# Patient Record
Sex: FEMALE | Race: WHITE | NOT HISPANIC OR LATINO | Employment: OTHER | URBAN - METROPOLITAN AREA
[De-identification: names, ages, dates, MRNs, and addresses within clinical notes are randomized per-mention and may not be internally consistent; named-entity substitution may affect disease eponyms.]

---

## 2023-06-09 LAB
ALANINE AMINOTRANSFERASE (SGPT) (U/L) IN SER/PLAS: 10 U/L (ref 7–45)
ALBUMIN (G/DL) IN SER/PLAS: 4 G/DL (ref 3.4–5)
ALKALINE PHOSPHATASE (U/L) IN SER/PLAS: 66 U/L (ref 33–136)
ANION GAP IN SER/PLAS: 14 MMOL/L (ref 10–20)
ASPARTATE AMINOTRANSFERASE (SGOT) (U/L) IN SER/PLAS: 13 U/L (ref 9–39)
BASOPHILS (10*3/UL) IN BLOOD BY AUTOMATED COUNT: 0.08 X10E9/L (ref 0–0.1)
BASOPHILS/100 LEUKOCYTES IN BLOOD BY AUTOMATED COUNT: 1.2 % (ref 0–2)
BILIRUBIN TOTAL (MG/DL) IN SER/PLAS: 0.5 MG/DL (ref 0–1.2)
CALCIDIOL (25 OH VITAMIN D3) (NG/ML) IN SER/PLAS: 71 NG/ML
CALCIUM (MG/DL) IN SER/PLAS: 9.5 MG/DL (ref 8.6–10.3)
CARBON DIOXIDE, TOTAL (MMOL/L) IN SER/PLAS: 28 MMOL/L (ref 21–32)
CHLORIDE (MMOL/L) IN SER/PLAS: 103 MMOL/L (ref 98–107)
CHOLESTEROL (MG/DL) IN SER/PLAS: 211 MG/DL (ref 0–199)
CHOLESTEROL IN HDL (MG/DL) IN SER/PLAS: 42.7 MG/DL
CHOLESTEROL/HDL RATIO: 4.9
CREATININE (MG/DL) IN SER/PLAS: 0.8 MG/DL (ref 0.5–1.05)
EOSINOPHILS (10*3/UL) IN BLOOD BY AUTOMATED COUNT: 0.1 X10E9/L (ref 0–0.4)
EOSINOPHILS/100 LEUKOCYTES IN BLOOD BY AUTOMATED COUNT: 1.5 % (ref 0–6)
ERYTHROCYTE DISTRIBUTION WIDTH (RATIO) BY AUTOMATED COUNT: 12.4 % (ref 11.5–14.5)
ERYTHROCYTE MEAN CORPUSCULAR HEMOGLOBIN CONCENTRATION (G/DL) BY AUTOMATED: 33.8 G/DL (ref 32–36)
ERYTHROCYTE MEAN CORPUSCULAR VOLUME (FL) BY AUTOMATED COUNT: 89 FL (ref 80–100)
ERYTHROCYTES (10*6/UL) IN BLOOD BY AUTOMATED COUNT: 4.5 X10E12/L (ref 4–5.2)
GFR FEMALE: 76 ML/MIN/1.73M2
GLUCOSE (MG/DL) IN SER/PLAS: 91 MG/DL (ref 74–99)
HEMATOCRIT (%) IN BLOOD BY AUTOMATED COUNT: 39.9 % (ref 36–46)
HEMOGLOBIN (G/DL) IN BLOOD: 13.5 G/DL (ref 12–16)
IMMATURE GRANULOCYTES/100 LEUKOCYTES IN BLOOD BY AUTOMATED COUNT: 0.1 % (ref 0–0.9)
LDL: 130 MG/DL (ref 0–99)
LEUKOCYTES (10*3/UL) IN BLOOD BY AUTOMATED COUNT: 6.7 X10E9/L (ref 4.4–11.3)
LYMPHOCYTES (10*3/UL) IN BLOOD BY AUTOMATED COUNT: 2.2 X10E9/L (ref 0.8–3)
LYMPHOCYTES/100 LEUKOCYTES IN BLOOD BY AUTOMATED COUNT: 33 % (ref 13–44)
MONOCYTES (10*3/UL) IN BLOOD BY AUTOMATED COUNT: 0.7 X10E9/L (ref 0.05–0.8)
MONOCYTES/100 LEUKOCYTES IN BLOOD BY AUTOMATED COUNT: 10.5 % (ref 2–10)
NEUTROPHILS (10*3/UL) IN BLOOD BY AUTOMATED COUNT: 3.58 X10E9/L (ref 1.6–5.5)
NEUTROPHILS/100 LEUKOCYTES IN BLOOD BY AUTOMATED COUNT: 53.7 % (ref 40–80)
PLATELETS (10*3/UL) IN BLOOD AUTOMATED COUNT: 306 X10E9/L (ref 150–450)
POTASSIUM (MMOL/L) IN SER/PLAS: 4.6 MMOL/L (ref 3.5–5.3)
PROTEIN TOTAL: 7.1 G/DL (ref 6.4–8.2)
SODIUM (MMOL/L) IN SER/PLAS: 140 MMOL/L (ref 136–145)
THYROTROPIN (MIU/L) IN SER/PLAS BY DETECTION LIMIT <= 0.05 MIU/L: 2.69 MIU/L (ref 0.44–3.98)
TRIGLYCERIDE (MG/DL) IN SER/PLAS: 193 MG/DL (ref 0–149)
UREA NITROGEN (MG/DL) IN SER/PLAS: 15 MG/DL (ref 6–23)
VLDL: 39 MG/DL (ref 0–40)

## 2023-06-15 ENCOUNTER — OFFICE VISIT (OUTPATIENT)
Dept: PRIMARY CARE | Facility: CLINIC | Age: 78
End: 2023-06-15
Payer: MEDICARE

## 2023-06-15 VITALS
DIASTOLIC BLOOD PRESSURE: 90 MMHG | TEMPERATURE: 98.1 F | SYSTOLIC BLOOD PRESSURE: 150 MMHG | WEIGHT: 255.2 LBS | BODY MASS INDEX: 43.57 KG/M2 | HEIGHT: 64 IN

## 2023-06-15 DIAGNOSIS — E78.41 ELEVATED LIPOPROTEIN(A): ICD-10-CM

## 2023-06-15 DIAGNOSIS — R73.01 ABNORMAL FASTING GLUCOSE: ICD-10-CM

## 2023-06-15 DIAGNOSIS — G47.33 OSA (OBSTRUCTIVE SLEEP APNEA): ICD-10-CM

## 2023-06-15 DIAGNOSIS — F32.9 REACTIVE DEPRESSION: ICD-10-CM

## 2023-06-15 DIAGNOSIS — I10 BENIGN ESSENTIAL HYPERTENSION: ICD-10-CM

## 2023-06-15 DIAGNOSIS — Z00.00 MEDICARE ANNUAL WELLNESS VISIT, SUBSEQUENT: ICD-10-CM

## 2023-06-15 DIAGNOSIS — C50.919 MALIGNANT NEOPLASM OF FEMALE BREAST, UNSPECIFIED ESTROGEN RECEPTOR STATUS, UNSPECIFIED LATERALITY, UNSPECIFIED SITE OF BREAST (MULTI): ICD-10-CM

## 2023-06-15 DIAGNOSIS — F51.01 PRIMARY INSOMNIA: Primary | ICD-10-CM

## 2023-06-15 DIAGNOSIS — E83.51 HYPOCALCEMIA: ICD-10-CM

## 2023-06-15 DIAGNOSIS — I10 HTN (HYPERTENSION), BENIGN: Primary | ICD-10-CM

## 2023-06-15 DIAGNOSIS — J30.2 SEASONAL ALLERGIC RHINITIS, UNSPECIFIED TRIGGER: ICD-10-CM

## 2023-06-15 DIAGNOSIS — R92.8 ABNORMAL MAMMOGRAM: ICD-10-CM

## 2023-06-15 DIAGNOSIS — E55.9 VITAMIN D DEFICIENCY: ICD-10-CM

## 2023-06-15 DIAGNOSIS — N95.1 MENOPAUSAL STATE: ICD-10-CM

## 2023-06-15 PROBLEM — F32.A DEPRESSION: Status: ACTIVE | Noted: 2023-06-15

## 2023-06-15 PROBLEM — H02.413 MECHANICAL PTOSIS OF EYELID OF BOTH EYES: Status: ACTIVE | Noted: 2023-06-15

## 2023-06-15 PROBLEM — E78.5 HYPERLIPIDEMIA: Status: ACTIVE | Noted: 2023-06-15

## 2023-06-15 PROBLEM — J30.9 ALLERGIC RHINITIS: Status: ACTIVE | Noted: 2023-06-15

## 2023-06-15 PROBLEM — G47.00 INSOMNIA: Status: ACTIVE | Noted: 2023-06-15

## 2023-06-15 PROBLEM — H02.403 PTOSIS, BOTH EYELIDS: Status: ACTIVE | Noted: 2023-06-15

## 2023-06-15 LAB — POC HEMOGLOBIN A1C: 5.8 % (ref 4.2–6.5)

## 2023-06-15 PROCEDURE — 1170F FXNL STATUS ASSESSED: CPT | Performed by: FAMILY MEDICINE

## 2023-06-15 PROCEDURE — 99214 OFFICE O/P EST MOD 30 MIN: CPT | Performed by: FAMILY MEDICINE

## 2023-06-15 PROCEDURE — 3077F SYST BP >= 140 MM HG: CPT | Performed by: FAMILY MEDICINE

## 2023-06-15 PROCEDURE — 1159F MED LIST DOCD IN RCRD: CPT | Performed by: FAMILY MEDICINE

## 2023-06-15 PROCEDURE — 1036F TOBACCO NON-USER: CPT | Performed by: FAMILY MEDICINE

## 2023-06-15 PROCEDURE — 83036 HEMOGLOBIN GLYCOSYLATED A1C: CPT | Performed by: FAMILY MEDICINE

## 2023-06-15 PROCEDURE — G0439 PPPS, SUBSEQ VISIT: HCPCS | Performed by: FAMILY MEDICINE

## 2023-06-15 PROCEDURE — 3080F DIAST BP >= 90 MM HG: CPT | Performed by: FAMILY MEDICINE

## 2023-06-15 PROCEDURE — 1160F RVW MEDS BY RX/DR IN RCRD: CPT | Performed by: FAMILY MEDICINE

## 2023-06-15 RX ORDER — CHOLECALCIFEROL (VITAMIN D3) 125 MCG
1 CAPSULE ORAL DAILY
COMMUNITY
Start: 2019-01-22 | End: 2023-09-15 | Stop reason: WASHOUT

## 2023-06-15 RX ORDER — LOSARTAN POTASSIUM 100 MG/1
100 TABLET ORAL DAILY
COMMUNITY
Start: 2023-03-01 | End: 2023-06-19

## 2023-06-15 RX ORDER — CITALOPRAM 10 MG/1
10 TABLET ORAL DAILY
COMMUNITY
End: 2023-09-15 | Stop reason: SDUPTHER

## 2023-06-15 RX ORDER — METOPROLOL SUCCINATE 100 MG/1
100 TABLET, EXTENDED RELEASE ORAL DAILY
COMMUNITY
End: 2023-09-15 | Stop reason: SDUPTHER

## 2023-06-15 ASSESSMENT — ENCOUNTER SYMPTOMS
BRUISES/BLEEDS EASILY: 0
BLOOD IN STOOL: 0
HEADACHES: 0
FATIGUE: 0
EYE PAIN: 0
APPETITE CHANGE: 0
EYE REDNESS: 0
SHORTNESS OF BREATH: 0
SORE THROAT: 0
ARTHRALGIAS: 0
FEVER: 0
DIARRHEA: 0
VOMITING: 0
FREQUENCY: 0
WEAKNESS: 0
ABDOMINAL PAIN: 0
DIZZINESS: 0
EYE DISCHARGE: 0
NUMBNESS: 0
CHILLS: 0
EYE ITCHING: 0
CONSTIPATION: 0
AGITATION: 0
SEIZURES: 0
CONFUSION: 0
ADENOPATHY: 0
WHEEZING: 0
TREMORS: 0
NAUSEA: 0
TROUBLE SWALLOWING: 0
COUGH: 0
NERVOUS/ANXIOUS: 0
VOICE CHANGE: 0
ABDOMINAL DISTENTION: 0
HEMATURIA: 0
NECK STIFFNESS: 0
ANAL BLEEDING: 0
BACK PAIN: 0
CHEST TIGHTNESS: 0
SINUS PRESSURE: 0
PHOTOPHOBIA: 0
SLEEP DISTURBANCE: 0
NECK PAIN: 0
SPEECH DIFFICULTY: 0
UNEXPECTED WEIGHT CHANGE: 0
PALPITATIONS: 0
DECREASED CONCENTRATION: 0
DIAPHORESIS: 0

## 2023-06-15 ASSESSMENT — PATIENT HEALTH QUESTIONNAIRE - PHQ9
SUM OF ALL RESPONSES TO PHQ9 QUESTIONS 1 AND 2: 0
1. LITTLE INTEREST OR PLEASURE IN DOING THINGS: NOT AT ALL
1. LITTLE INTEREST OR PLEASURE IN DOING THINGS: NOT AT ALL
2. FEELING DOWN, DEPRESSED OR HOPELESS: NOT AT ALL
SUM OF ALL RESPONSES TO PHQ9 QUESTIONS 1 AND 2: 0
2. FEELING DOWN, DEPRESSED OR HOPELESS: NOT AT ALL

## 2023-06-15 ASSESSMENT — ACTIVITIES OF DAILY LIVING (ADL)
MANAGING_FINANCES: INDEPENDENT
TAKING_MEDICATION: INDEPENDENT
GROCERY_SHOPPING: INDEPENDENT
DOING_HOUSEWORK: INDEPENDENT
DRESSING: INDEPENDENT
BATHING: INDEPENDENT

## 2023-06-15 NOTE — PROGRESS NOTES
"Subjective   Reason for Visit: Kathleen A Olszewski is an 78 y.o. female here for a Medicare Wellness visit.               HPI  #1 Health Maintenance:  DTaP 11/11/2011  Pneumovax 9/12/2012  Prevnar 13 done 1/22/2015  Zostavax done 1/22/2019 on the list waiting  Colonoscopy 11/29/2005 Dr. Gage, 1/26/2016 Dr. Salvador to be scheduled when her daughter can take her (7/21/2016), 1/30/2017 referral made, 7/27/2017, 1/18/2018 reminded, April 13, 2018, 1/22/2019 reminded June 22, 2020 Dr. Myers colonoscopy completed good for 10 years  Pap smear  Mammogram 2/29/2016 [facility] normal, 3/8/2017 normal, recheck 3/12 2018 completed, March 19, 2019 normal June 18, 2020 mammogram completed  DEXA scan 10/27/2011 spine -0.7, hip -0.3, femoral neck -0.4 1/26/2016 pending, will do January 2017 secondary to insurance restrictions, 1/30/2017 slip written, 7/27/2017 spine -0.1 (+6%), hip -0.3 (+0.9%), femoral neck -0.2 (+1.4%), will wait till July 2020  CMP 8/28/2014, 7/28/2015, 7/21/2016 normal, 1/12/2018 (glucose 115), 1/18/2018 hemoglobin A1c 6.4%, 1/15/2019 CMP (glucose 109+ TSH 2.64+ CBC plus vitamin D 53, December 3, 2020 CMP (glucose 102) plus TSH 3.44+ vitamin D 50  Vitamin D 6/27/2014 value 32, 7/28/15 pending 42, 7/21/2016 39, 1/12/2018 value 27, 4/2/2018 value 44 junk  Will continue on 6000 international units of vitamin D3, but will stop taking \"gumies\" and will recheck in 3 months  7/19/2018 hemoglobin A1c down from 6.4% down to 6.1% (weight has come down from 270 down to 262 and is now 259 pounds), 1/22/2019 weight the same promises 10 pounds in the next 6 months, CMP January 6, 2020 (glucose 104), CBC (platelets 148,000) plus TSH 2.41+ vitamin D 71 (will go from 5000 down to 4000 international units daily)  May 25, 2021 hemoglobin A1c 5.9% plus CBC (monocytes 10%)   January 19, 2023 A1c 5.8%  1    #2 Nasal Obstruction:  The patient has been having trouble breathing through her nose and as a result has to breathe " through her mouth. She has had a nasal septal deviation and her right nostril is more obstructive than the left. I will have her see Dr. Malou Marin in consultation    #3 Hyperlipidemia:  1/18/2018 total 205 HDL 42 ratio 4.9  triglycerides 187  4/13/2018 total 218 HDL 46 ratio 4.8 LDL 14.2 triglycerides 154  7/19/2018 total 216 HDL 44 ratio 4.9  triglycerides 163  1/15/2019 total 235 HDL 48.2 ratio 4.9  triglycerides 165  July 10, 2019 total 195 HDL 44 ratio 4.5  triglycerides 153  January 10, 2020 and June 30, 2020 completed ratio 5.1  May 25, 2021 total 220 HDL 42.5 ratio 5.2  triglycerides 173  January 18, 2023 total 225 LDL 48 ratio 4.1  triglycerides 199    #4 Elevated BMI:  June 1, 2021 BMI 45.67  BMI is elevated the patient was notified as to its significance both in terms of general health status, but also specifically as it relates to other serious medical problems including hypertension, coronary artery disease, diabetes, GERD, and sleep apnea. I have recommended the patient start following a diet plan to lose weight which includes calorie restriction, portion regulation, and regular exercise.  July 28, 2021 BMI unchanged reiterated above  January 18, 2023 BMI 45    January 19, 2023  This 78-year-old is here today for normal 6-month follow-up. She has been doing well over the holiday her hemoglobin A1c did go back up to a value of 5.8%. Her lipids are stable. She will be coming in in June for her Medicare wellness exam. I am also giving her a lab slip to get her labs rechecked prior to that visit. Her blood pressure readings have been high. Her initial reading this morning was 150/90. She will continue on her metoprolol 100 mg but I have also given her a prescription for losartan 100 mg to start taking if her blood pressure at home are above 140 and or above 85.  1    June 2, 2022  This 77-year-old woman is here today for Medicare wellness exam. She is also here  "because her recent lab test showed a low level of calcium. Because she is lactose intolerant she is concerned that she may not be getting enough calcium through her diet. I am sending her downstairs to repeat the calcium level. Her calcium was 8.4.  We also had a chance to go through her Medicare wellness exam for 2022.    July 28, 2021  Approximately 3 weeks ago this 76-year-old woman developed pain on the right side of her lower back and hip area. She does not remember doing anything specific to aggravate her back but was noticing spasms particularly on the right side. Around the same time she had to have a tooth pulled because it was cracked. She started to feel better and then decided she was going to try to clean her carpets upstairs. After trying to clean the carpets her back pain flared and on Sunday night she noted a much more severe pain shooting down her right leg like an electric shock lasting for only a few seconds. She also felt some numbness around her right knee at that time. 2 days ago on Monday she was walking onto her deck when suddenly she \"went down on her knees\". She did have trouble back up and has noted some swelling from the fall around both of her knees. Last night she took a left over pain medication but it did not seem to help that much.  I am sending her to physical therapy to start treatment. Her reflexes are symmetrical in her ankles and EHL strength is symmetrical. Her patellar reflex may be slightly less on the right side.    4/12/21 (Dr Hay)  Here for 3month follow-up for BP  Hypertension-taking INCREASED DOSE of metoprolol 100 mg daily without any issues x 3 month   She checks her blood pressure at home and it is typically 140s-150s/90s  She denies any chest pain, shortness of breath, headache, nausea, dizziness, leg swelling  She does exercise at home 3-4 times a week with a mix of strength and cardio training however she was previously going to the gym and felt her workouts were " much better than- now immunized for covid and hoping to go back to gym soon   Her diet has been poor lately due to quarantine    11/4/20 (Dr Acosta)  Impression: 75-year-old female coming in for follow-up after undergoing excisional biopsy of a 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    September 15, 2020  This 75-year-old woman has noted some swelling on the inside of her right thigh. The swelling is below her groin and measures approximately 13 cm in diameter. It feels as though it has fluid inside and it does transilluminate. I am not able to reduce it. I am sending her for an ultrasound of the groin and I am also referring her to a general surgeon.    June 30, 2020  This 75-year-old woman is here today for normal six-month follow-up. She is doing well and she is up-to-date on all of her screening tests. She recently had a mammogram on June 18, 2020 and she also had a colonoscopy on June 22, 2020 good for 10 years.        1 Amended By: Dominik Walsh; Jan 19 2023 1:34 PM EST    Patient Care Team:  Dominik Walsh MD as PCP - General  Dominik Walsh MD [facility] PCP [facility]ributed [facility]     Review of Systems   Constitutional:  Negative for appetite change, chills, diaphoresis, fatigue, fever and unexpected weight change.   HENT:  Negative for congestion, ear discharge, ear pain, hearing loss, nosebleeds, sinus pressure, sore throat, tinnitus, trouble swallowing and voice change.    Eyes:  Negative for photophobia, pain, discharge, redness, itching and visual disturbance.   Respiratory:  Negative for cough, chest tightness, shortness of breath and wheezing.    Cardiovascular:  Negative for chest pain, palpitations and leg swelling.   Gastrointestinal:  Negative for abdominal distention, abdominal pain, anal bleeding, blood in stool, constipation, diarrhea, nausea and vomiting.   Endocrine: Negative for polyuria.   Genitourinary:  Negative for frequency and hematuria.   Musculoskeletal:  Negative for  arthralgias, back pain, neck pain and neck stiffness.   Skin:  Negative for rash.   Allergic/Immunologic: Negative for environmental allergies and food allergies.   Neurological:  Negative for dizziness, tremors, seizures, syncope, speech difficulty, weakness, numbness and headaches.   Hematological:  Negative for adenopathy. Does not bruise/bleed easily.   Psychiatric/Behavioral:  Negative for agitation, behavioral problems, confusion, decreased concentration, sleep disturbance and suicidal ideas. The patient is not nervous/anxious.        Objective   Vitals:  There were no vitals taken for this visit.      Physical Exam  Vitals and nursing note reviewed.   Constitutional:       General: She is not in acute distress.     Appearance: Normal appearance. She is obese. She is not ill-appearing.   HENT:      Head: Normocephalic.      Right Ear: There is no impacted cerumen.      Nose: No congestion or rhinorrhea.      Mouth/Throat:      Mouth: Mucous membranes are moist.      Pharynx: Oropharynx is clear.   Eyes:      Conjunctiva/sclera: Conjunctivae normal.      Pupils: Pupils are equal, round, and reactive to light.   Neck:      Vascular: No carotid bruit.   Cardiovascular:      Rate and Rhythm: Normal rate and regular rhythm.      Pulses: Normal pulses.      Heart sounds: Normal heart sounds. No murmur heard.  Pulmonary:      Effort: Pulmonary effort is normal. No respiratory distress.      Breath sounds: No wheezing or rales.   Abdominal:      General: Abdomen is flat. Bowel sounds are normal. There is no distension.      Palpations: There is no mass.      Tenderness: There is no abdominal tenderness. There is no guarding or rebound.      Hernia: No hernia is present.   Musculoskeletal:         General: No swelling, tenderness or deformity. Normal range of motion.      Cervical back: Normal range of motion and neck supple. No tenderness.      Right lower leg: No edema.      Left lower leg: No edema.    Lymphadenopathy:      Cervical: No cervical adenopathy.   Skin:     General: Skin is warm and dry.      Findings: No erythema or rash.   Neurological:      General: No focal deficit present.      Mental Status: She is alert and oriented to person, place, and time. Mental status is at baseline.   Psychiatric:         Mood and Affect: Mood normal.         Behavior: Behavior normal.         Thought Content: Thought content normal.         Judgment: Judgment normal.         Assessment/Plan   Problem List Items Addressed This Visit    None

## 2023-06-19 RX ORDER — LOSARTAN POTASSIUM 100 MG/1
TABLET ORAL
Qty: 90 TABLET | Refills: 0 | Status: SHIPPED | OUTPATIENT
Start: 2023-06-19 | End: 2023-09-15 | Stop reason: SDUPTHER

## 2023-08-10 PROBLEM — D72.829 LEUKOCYTOSIS: Status: ACTIVE | Noted: 2023-08-10

## 2023-08-10 PROBLEM — R26.9 ALTERED GAIT: Status: ACTIVE | Noted: 2023-08-10

## 2023-08-10 PROBLEM — R22.41 MASS OF RIGHT LOWER EXTREMITY: Status: ACTIVE | Noted: 2023-08-10

## 2023-08-10 PROBLEM — M25.551 RIGHT HIP PAIN: Status: ACTIVE | Noted: 2023-08-10

## 2023-08-10 PROBLEM — R06.83 SNORING: Status: ACTIVE | Noted: 2023-08-10

## 2023-08-10 PROBLEM — R19.5 POSITIVE COLORECTAL CANCER SCREENING USING DNA-BASED STOOL TEST: Status: ACTIVE | Noted: 2023-08-10

## 2023-08-10 PROBLEM — M54.31 RIGHT SCIATIC NERVE PAIN: Status: ACTIVE | Noted: 2023-08-10

## 2023-08-10 PROBLEM — M25.511 SHOULDER PAIN, RIGHT: Status: ACTIVE | Noted: 2023-08-10

## 2023-08-10 PROBLEM — R19.09 INGUINAL SWELLING: Status: ACTIVE | Noted: 2023-08-10

## 2023-09-12 PROBLEM — L57.0 ACTINIC KERATOSIS: Status: ACTIVE | Noted: 2023-03-09

## 2023-09-12 PROBLEM — L71.9 ROSACEA, UNSPECIFIED: Status: ACTIVE | Noted: 2023-03-09

## 2023-09-12 PROBLEM — L72.3 SEBACEOUS CYST: Status: ACTIVE | Noted: 2023-03-09

## 2023-09-12 PROBLEM — L23.7 ALLERGIC CONTACT DERMATITIS DUE TO PLANTS, EXCEPT FOOD: Status: ACTIVE | Noted: 2023-03-09

## 2023-09-12 PROBLEM — L82.0 INFLAMED SEBORRHEIC KERATOSIS: Status: ACTIVE | Noted: 2023-03-09

## 2023-09-12 RX ORDER — HYDROCORTISONE 25 MG/G
CREAM TOPICAL 2 TIMES DAILY
COMMUNITY
Start: 2023-08-03

## 2023-09-15 ENCOUNTER — LAB (OUTPATIENT)
Dept: LAB | Facility: LAB | Age: 78
End: 2023-09-15
Payer: MEDICARE

## 2023-09-15 ENCOUNTER — OFFICE VISIT (OUTPATIENT)
Dept: PRIMARY CARE | Facility: CLINIC | Age: 78
End: 2023-09-15
Payer: MEDICARE

## 2023-09-15 VITALS
WEIGHT: 258 LBS | BODY MASS INDEX: 44.99 KG/M2 | HEART RATE: 65 BPM | TEMPERATURE: 97.3 F | OXYGEN SATURATION: 98 % | DIASTOLIC BLOOD PRESSURE: 90 MMHG | SYSTOLIC BLOOD PRESSURE: 140 MMHG

## 2023-09-15 DIAGNOSIS — E78.2 MIXED HYPERLIPIDEMIA: ICD-10-CM

## 2023-09-15 DIAGNOSIS — R73.03 PREDIABETES: ICD-10-CM

## 2023-09-15 DIAGNOSIS — I10 HTN (HYPERTENSION), BENIGN: ICD-10-CM

## 2023-09-15 DIAGNOSIS — F41.8 ANXIETY ABOUT HEALTH: Primary | ICD-10-CM

## 2023-09-15 LAB
ALANINE AMINOTRANSFERASE (SGPT) (U/L) IN SER/PLAS: 11 U/L (ref 7–45)
ALBUMIN (G/DL) IN SER/PLAS: 4 G/DL (ref 3.4–5)
ALKALINE PHOSPHATASE (U/L) IN SER/PLAS: 71 U/L (ref 33–136)
ANION GAP IN SER/PLAS: 11 MMOL/L (ref 10–20)
ASPARTATE AMINOTRANSFERASE (SGOT) (U/L) IN SER/PLAS: 12 U/L (ref 9–39)
BILIRUBIN TOTAL (MG/DL) IN SER/PLAS: 0.6 MG/DL (ref 0–1.2)
CALCIUM (MG/DL) IN SER/PLAS: 8.9 MG/DL (ref 8.6–10.3)
CARBON DIOXIDE, TOTAL (MMOL/L) IN SER/PLAS: 29 MMOL/L (ref 21–32)
CHLORIDE (MMOL/L) IN SER/PLAS: 104 MMOL/L (ref 98–107)
CHOLESTEROL (MG/DL) IN SER/PLAS: 221 MG/DL (ref 0–199)
CHOLESTEROL IN HDL (MG/DL) IN SER/PLAS: 48.8 MG/DL
CHOLESTEROL/HDL RATIO: 4.5
CREATININE (MG/DL) IN SER/PLAS: 0.72 MG/DL (ref 0.5–1.05)
GFR FEMALE: 86 ML/MIN/1.73M2
GLUCOSE (MG/DL) IN SER/PLAS: 92 MG/DL (ref 74–99)
LDL: 142 MG/DL (ref 0–99)
POTASSIUM (MMOL/L) IN SER/PLAS: 4.4 MMOL/L (ref 3.5–5.3)
PROTEIN TOTAL: 6.9 G/DL (ref 6.4–8.2)
SODIUM (MMOL/L) IN SER/PLAS: 140 MMOL/L (ref 136–145)
TRIGLYCERIDE (MG/DL) IN SER/PLAS: 152 MG/DL (ref 0–149)
UREA NITROGEN (MG/DL) IN SER/PLAS: 15 MG/DL (ref 6–23)
VLDL: 30 MG/DL (ref 0–40)

## 2023-09-15 PROCEDURE — 80061 LIPID PANEL: CPT

## 2023-09-15 PROCEDURE — 3080F DIAST BP >= 90 MM HG: CPT | Performed by: NURSE PRACTITIONER

## 2023-09-15 PROCEDURE — 80053 COMPREHEN METABOLIC PANEL: CPT

## 2023-09-15 PROCEDURE — 36415 COLL VENOUS BLD VENIPUNCTURE: CPT

## 2023-09-15 PROCEDURE — 3077F SYST BP >= 140 MM HG: CPT | Performed by: NURSE PRACTITIONER

## 2023-09-15 PROCEDURE — 99214 OFFICE O/P EST MOD 30 MIN: CPT | Performed by: NURSE PRACTITIONER

## 2023-09-15 PROCEDURE — 1160F RVW MEDS BY RX/DR IN RCRD: CPT | Performed by: NURSE PRACTITIONER

## 2023-09-15 PROCEDURE — 1159F MED LIST DOCD IN RCRD: CPT | Performed by: NURSE PRACTITIONER

## 2023-09-15 PROCEDURE — 1036F TOBACCO NON-USER: CPT | Performed by: NURSE PRACTITIONER

## 2023-09-15 PROCEDURE — 83036 HEMOGLOBIN GLYCOSYLATED A1C: CPT

## 2023-09-15 RX ORDER — CITALOPRAM 10 MG/1
10 TABLET ORAL DAILY
Qty: 90 TABLET | Refills: 3 | Status: SHIPPED | OUTPATIENT
Start: 2023-09-15

## 2023-09-15 RX ORDER — METOPROLOL SUCCINATE 100 MG/1
100 TABLET, EXTENDED RELEASE ORAL DAILY
Qty: 90 TABLET | Refills: 3 | Status: SHIPPED | OUTPATIENT
Start: 2023-09-15

## 2023-09-15 RX ORDER — LOSARTAN POTASSIUM 100 MG/1
100 TABLET ORAL DAILY
Qty: 90 TABLET | Refills: 3 | Status: SHIPPED | OUTPATIENT
Start: 2023-09-15

## 2023-09-15 ASSESSMENT — PATIENT HEALTH QUESTIONNAIRE - PHQ9
SUM OF ALL RESPONSES TO PHQ9 QUESTIONS 1 AND 2: 0
2. FEELING DOWN, DEPRESSED OR HOPELESS: NOT AT ALL
1. LITTLE INTEREST OR PLEASURE IN DOING THINGS: NOT AT ALL

## 2023-09-15 ASSESSMENT — ENCOUNTER SYMPTOMS
DEPRESSION: 0
OCCASIONAL FEELINGS OF UNSTEADINESS: 0
LOSS OF SENSATION IN FEET: 0

## 2023-09-15 NOTE — PROGRESS NOTES
Subjective   Patient ID: Kathie A Olszewski is a 78 y.o. female who presents for 3 Month Follow Up.    HPI   Has had a bad summer.  Flying squirrels in her house.  Tested well water - has high level of arsenic.  Got stung by a bee - Early August  Washed, took stinger out, used benadryl.  Right on the knee.  Went to [facility] 8/3  Right foot swollen and pink streak on lower leg.  To Urgent Care in [place]  Sent to [facility]  for U/S to r/o blood clot.  Went to [facility]  8/6    Has my chart set up.  DERM - David  Mammogram on Monday   RUCHI  Eye Cataracts  Will need Losartan  Checks BP at home.  Seems to be 140/70   138/88 at [facility]  At ER  BP was elevated but then came down to 130/78    Review of Systems   All other systems reviewed and are negative.      Objective   /90 (BP Location: Left arm, Patient Position: Sitting, BP Cuff Size: Large adult)   Pulse 65   Temp 36.3 °C (97.3 °F) (Skin)   Wt 117 kg (258 lb)   SpO2 98%   BMI 44.99 kg/m²     Physical Exam  Vitals and nursing note reviewed.   Constitutional:       Appearance: She is obese.   HENT:      Head: Normocephalic and atraumatic.      Right Ear: Tympanic membrane, ear canal and external ear normal.      Left Ear: Tympanic membrane, ear canal and external ear normal.      Mouth/Throat:      Pharynx: Oropharynx is clear.   Cardiovascular:      Rate and Rhythm: Regular rhythm.      Heart sounds: Normal heart sounds.   Pulmonary:      Effort: Pulmonary effort is normal.      Breath sounds: Normal breath sounds.   Abdominal:      General: Bowel sounds are normal.      Palpations: Abdomen is soft.   Musculoskeletal:      Cervical back: Normal range of motion and neck supple.   Skin:     General: Skin is warm.   Neurological:      Mental Status: She is alert and oriented to person, place, and time.   Psychiatric:      Comments: Mildly anxious mood         Assessment/Plan   Problem List Items Addressed This Visit             ICD-10-CM    Anxiety about  health - Primary F41.8    Relevant Medications    citalopram (CeleXA) 10 mg tablet    HTN (hypertension), benign I10    Relevant Medications    losartan (Cozaar) 100 mg tablet    metoprolol succinate XL (Toprol-XL) 100 mg 24 hr tablet    Hyperlipidemia E78.5    Relevant Orders    Comprehensive Metabolic Panel (Completed)    Lipid Panel (Completed)    Prediabetes R73.03    Relevant Orders    Hemoglobin A1C (Completed)

## 2023-09-15 NOTE — PATIENT INSTRUCTIONS
Nice to see you today.  Fasting labs and I will follow up with these.  Keep as active as you can and watch the carbs and portion size.    Follow up based on labs

## 2023-09-16 LAB
ESTIMATED AVERAGE GLUCOSE FOR HBA1C: 126 MG/DL
HEMOGLOBIN A1C/HEMOGLOBIN TOTAL IN BLOOD: 6 %

## 2023-09-29 DIAGNOSIS — E78.2 MIXED HYPERLIPIDEMIA: ICD-10-CM

## 2023-09-29 DIAGNOSIS — R73.03 PREDIABETES: Primary | ICD-10-CM

## 2023-09-29 PROBLEM — F41.8 ANXIETY ABOUT HEALTH: Status: ACTIVE | Noted: 2023-09-29

## 2023-09-29 NOTE — RESULT ENCOUNTER NOTE
Overall labs are stable.  The 90 day average sugar is a little higher.    Continue with dietary curtailment.  High fiber, low fat and low refined carbohydrates.  Move as much as you can.  Recheck in 6 months with fasting labs before visit.

## 2024-08-27 ENCOUNTER — LAB (OUTPATIENT)
Dept: LAB | Facility: LAB | Age: 79
End: 2024-08-27
Payer: MEDICARE

## 2024-08-27 DIAGNOSIS — E78.2 MIXED HYPERLIPIDEMIA: ICD-10-CM

## 2024-08-27 DIAGNOSIS — R73.03 PREDIABETES: ICD-10-CM

## 2024-08-27 LAB
ALBUMIN SERPL BCP-MCNC: 4 G/DL (ref 3.4–5)
ALP SERPL-CCNC: 66 U/L (ref 33–136)
ALT SERPL W P-5'-P-CCNC: 10 U/L (ref 7–45)
ANION GAP SERPL CALC-SCNC: 8 MMOL/L (ref 10–20)
AST SERPL W P-5'-P-CCNC: 13 U/L (ref 9–39)
BILIRUB SERPL-MCNC: 0.5 MG/DL (ref 0–1.2)
BUN SERPL-MCNC: 13 MG/DL (ref 6–23)
CALCIUM SERPL-MCNC: 9 MG/DL (ref 8.6–10.3)
CHLORIDE SERPL-SCNC: 107 MMOL/L (ref 98–107)
CHOLEST SERPL-MCNC: 224 MG/DL (ref 0–199)
CHOLESTEROL/HDL RATIO: 4.4
CO2 SERPL-SCNC: 27 MMOL/L (ref 21–32)
CREAT SERPL-MCNC: 0.85 MG/DL (ref 0.5–1.05)
EGFRCR SERPLBLD CKD-EPI 2021: 71 ML/MIN/1.73M*2
GLUCOSE SERPL-MCNC: 96 MG/DL (ref 74–99)
HDLC SERPL-MCNC: 50.4 MG/DL
LDLC SERPL CALC-MCNC: 143 MG/DL
NON HDL CHOLESTEROL: 174 MG/DL (ref 0–149)
POTASSIUM SERPL-SCNC: 4 MMOL/L (ref 3.5–5.3)
PROT SERPL-MCNC: 7.3 G/DL (ref 6.4–8.2)
SODIUM SERPL-SCNC: 138 MMOL/L (ref 136–145)
TRIGL SERPL-MCNC: 155 MG/DL (ref 0–149)
VLDL: 31 MG/DL (ref 0–40)

## 2024-08-27 PROCEDURE — 36415 COLL VENOUS BLD VENIPUNCTURE: CPT

## 2024-08-27 PROCEDURE — 80061 LIPID PANEL: CPT

## 2024-08-27 PROCEDURE — 80053 COMPREHEN METABOLIC PANEL: CPT

## 2024-08-27 PROCEDURE — 83036 HEMOGLOBIN GLYCOSYLATED A1C: CPT

## 2024-08-28 DIAGNOSIS — F41.8 ANXIETY ABOUT HEALTH: ICD-10-CM

## 2024-08-28 DIAGNOSIS — I10 HTN (HYPERTENSION), BENIGN: ICD-10-CM

## 2024-08-28 LAB
EST. AVERAGE GLUCOSE BLD GHB EST-MCNC: 128 MG/DL
HBA1C MFR BLD: 6.1 %

## 2024-08-29 RX ORDER — CITALOPRAM 10 MG/1
10 TABLET ORAL DAILY
Qty: 90 TABLET | Refills: 0 | Status: SHIPPED | OUTPATIENT
Start: 2024-08-29

## 2024-08-29 RX ORDER — METOPROLOL SUCCINATE 100 MG/1
100 TABLET, EXTENDED RELEASE ORAL DAILY
Qty: 90 TABLET | Refills: 0 | Status: SHIPPED | OUTPATIENT
Start: 2024-08-29

## 2024-09-19 ENCOUNTER — APPOINTMENT (OUTPATIENT)
Dept: PRIMARY CARE | Facility: CLINIC | Age: 79
End: 2024-09-19
Payer: MEDICARE

## 2024-09-19 VITALS
HEIGHT: 63 IN | TEMPERATURE: 97.5 F | WEIGHT: 249 LBS | OXYGEN SATURATION: 98 % | BODY MASS INDEX: 44.12 KG/M2 | DIASTOLIC BLOOD PRESSURE: 90 MMHG | SYSTOLIC BLOOD PRESSURE: 160 MMHG | HEART RATE: 54 BPM

## 2024-09-19 DIAGNOSIS — Z13.89 SCREENING FOR MULTIPLE CONDITIONS: ICD-10-CM

## 2024-09-19 DIAGNOSIS — Z00.00 ROUTINE GENERAL MEDICAL EXAMINATION AT HEALTH CARE FACILITY: Primary | ICD-10-CM

## 2024-09-19 DIAGNOSIS — Z13.6 SCREENING FOR CARDIOVASCULAR CONDITION: ICD-10-CM

## 2024-09-19 DIAGNOSIS — Z78.0 ASYMPTOMATIC MENOPAUSAL STATE: ICD-10-CM

## 2024-09-19 DIAGNOSIS — I10 HTN (HYPERTENSION), BENIGN: ICD-10-CM

## 2024-09-19 DIAGNOSIS — Z12.31 ENCOUNTER FOR SCREENING MAMMOGRAM FOR BREAST CANCER: ICD-10-CM

## 2024-09-19 PROBLEM — R19.09 INGUINAL SWELLING: Status: RESOLVED | Noted: 2023-08-10 | Resolved: 2024-09-19

## 2024-09-19 PROBLEM — R06.83 SNORING: Status: RESOLVED | Noted: 2023-08-10 | Resolved: 2024-09-19

## 2024-09-19 PROBLEM — R73.01 ABNORMAL FASTING GLUCOSE: Status: RESOLVED | Noted: 2023-06-15 | Resolved: 2024-09-19

## 2024-09-19 PROBLEM — L23.7 ALLERGIC CONTACT DERMATITIS DUE TO PLANTS, EXCEPT FOOD: Status: RESOLVED | Noted: 2023-03-09 | Resolved: 2024-09-19

## 2024-09-19 PROCEDURE — 1158F ADVNC CARE PLAN TLK DOCD: CPT | Performed by: FAMILY MEDICINE

## 2024-09-19 PROCEDURE — 1170F FXNL STATUS ASSESSED: CPT | Performed by: FAMILY MEDICINE

## 2024-09-19 PROCEDURE — G0439 PPPS, SUBSEQ VISIT: HCPCS | Performed by: FAMILY MEDICINE

## 2024-09-19 PROCEDURE — G0447 BEHAVIOR COUNSEL OBESITY 15M: HCPCS | Performed by: FAMILY MEDICINE

## 2024-09-19 PROCEDURE — 1036F TOBACCO NON-USER: CPT | Performed by: FAMILY MEDICINE

## 2024-09-19 PROCEDURE — 1160F RVW MEDS BY RX/DR IN RCRD: CPT | Performed by: FAMILY MEDICINE

## 2024-09-19 PROCEDURE — 1123F ACP DISCUSS/DSCN MKR DOCD: CPT | Performed by: FAMILY MEDICINE

## 2024-09-19 PROCEDURE — G0446 INTENS BEHAVE THER CARDIO DX: HCPCS | Performed by: FAMILY MEDICINE

## 2024-09-19 PROCEDURE — 3080F DIAST BP >= 90 MM HG: CPT | Performed by: FAMILY MEDICINE

## 2024-09-19 PROCEDURE — 3077F SYST BP >= 140 MM HG: CPT | Performed by: FAMILY MEDICINE

## 2024-09-19 PROCEDURE — 99497 ADVNCD CARE PLAN 30 MIN: CPT | Performed by: FAMILY MEDICINE

## 2024-09-19 PROCEDURE — G0444 DEPRESSION SCREEN ANNUAL: HCPCS | Performed by: FAMILY MEDICINE

## 2024-09-19 PROCEDURE — 1159F MED LIST DOCD IN RCRD: CPT | Performed by: FAMILY MEDICINE

## 2024-09-19 RX ORDER — AMLODIPINE BESYLATE 5 MG/1
5 TABLET ORAL DAILY
Qty: 90 TABLET | Refills: 3 | Status: SHIPPED | OUTPATIENT
Start: 2024-09-19 | End: 2025-09-19

## 2024-09-19 RX ORDER — UBIDECARENONE 75 MG
500 CAPSULE ORAL DAILY
COMMUNITY

## 2024-09-19 RX ORDER — METOPROLOL SUCCINATE 25 MG/1
25 TABLET, EXTENDED RELEASE ORAL DAILY
Qty: 60 TABLET | Refills: 0 | Status: SHIPPED | OUTPATIENT
Start: 2024-09-19 | End: 2024-11-18

## 2024-09-19 RX ORDER — METOPROLOL SUCCINATE 50 MG/1
50 TABLET, EXTENDED RELEASE ORAL DAILY
Qty: 60 TABLET | Refills: 0 | Status: SHIPPED | OUTPATIENT
Start: 2024-09-19 | End: 2024-11-18

## 2024-09-19 ASSESSMENT — ACTIVITIES OF DAILY LIVING (ADL)
DOING_HOUSEWORK: INDEPENDENT
GROCERY_SHOPPING: INDEPENDENT
BATHING: INDEPENDENT
TAKING_MEDICATION: INDEPENDENT
DRESSING: INDEPENDENT
MANAGING_FINANCES: INDEPENDENT

## 2024-09-19 ASSESSMENT — ENCOUNTER SYMPTOMS
DEPRESSION: 0
OCCASIONAL FEELINGS OF UNSTEADINESS: 0
LOSS OF SENSATION IN FEET: 0

## 2024-09-19 NOTE — PROGRESS NOTES
Subjective   Patient ID: Kathie A Olszewski is a 79 y.o. female who presents for Medicare Annual Wellness Visit Subsequent.       Patient presents for transfer of care.  Past medical history is significant for hypertension and hyperlipidemia prediabetesHypocalcemia history of breast cancer anxiety depression and obstructive sleep apnea    Patient recently had labs done lipid panel showed total cholesterol 224 and LDL cholesterol 143 triglycerides of 155Metabolic panel was within normal limits hemoglobin A1c was at 6.1%  Code Status:   Living Will: has.    HCPOA: daughter ba    Social History     Tobacco Use   Smoking Status Former    Current packs/day: 0.00    Average packs/day: 1 pack/day for 10.0 years (10.0 ttl pk-yrs)    Types: Cigarettes    Start date:     Quit date:     Years since quittin.7    Passive exposure: Past   Smokeless Tobacco Never       Screening Tests:   Lung Cancer Screening: not indicated.   Mammogram 23; hx of right breast cancer s/p lumpectomy, chemo/radiation. Right breast s/p reduction   Colonoscopy 2020 +10  Cervical Cancer Screening: not indicated.    DXA Screening: no hx     Recent lipid panel shown below idicates hld   The 10-year ASCVD risk score (Jean-Paul ORTIZ, et al., 2019) is: 37%    Values used to calculate the score:      Age: 79 years      Sex: Female      Is Non- : No      Diabetic: No      Tobacco smoker: No      Systolic Blood Pressure: 160 mmHg      Is BP treated: Yes      HDL Cholesterol: 50.4 mg/dL      Total Cholesterol: 224 mg/dL     Not on choelsterol lowering medication     Acitivity:   Was going to fitness club and doing chair yoga   Does chair yoga 5 days per week   Looking to get back to gym   Has lost 10 lbs     Diet  - states she wasn't' eatingwell she freaked.   She cut out carbs   - no potatoes w/ dinner, no rice, no noodles.   Eating more vegetables, more fruit,more lean meat     Next year early fall going on a tri  "p    Patient Self Assessment of Health Status  Patient Self Assessment: Good  Functional Ability/Level of Safety  Cognitive Impairment Observed: No cognitive impairment observed  Cognitive Impairment Reported: No cognitive impairment reported by patient or family  Nutrition and Exercise  Current Diet: Well Balanced Diet  Adequate Fluid Intake: Yes  Caffeine: Yes  Exercise Frequency: Infrequently  ADL Screening  Hearing - Right Ear: Functional  Hearing - Left Ear: Functional  Bathing: Independent  Dressing: Independent  Walks in Home: Independent     Objective   /90 (BP Location: Left arm, Patient Position: Sitting, BP Cuff Size: Large adult)   Pulse 54   Temp 36.4 °C (97.5 °F) (Skin)   Ht 1.6 m (5' 3\")   Wt 113 kg (249 lb)   SpO2 98%   BMI 44.11 kg/m²       Physical Exam  Constitutional:       General: He is not in acute distress.     Appearance: Normal appearance. He is not ill-appearing, toxic-appearing or diaphoretic.   HENT:      Head: Normocephalic and atraumatic.   Eyes:      Extraocular Movements: Extraocular movements intact.      Pupils: Pupils are equal, round, and reactive to light.   Cardiovascular:      Rate and Rhythm: Normal rate and regular rhythm.      Pulses: Normal pulses.      Heart sounds: Normal heart sounds.   Pulmonary:      Effort: Pulmonary effort is normal.      Breath sounds: Normal breath sounds.   Neurological:      Mental Status: He is alert.       Problem List Items Addressed This Visit    None  Visit Diagnoses       Routine general medical examination at health care facility    -  Primary    Screening for multiple conditions        Screening for cardiovascular condition        Asymptomatic menopausal state        Relevant Orders    XR DEXA bone density    Encounter for screening mammogram for breast cancer        Relevant Orders    BI mammo bilateral screening tomosynthesis          Patient presenting for annual wellness exam.  She is working to lose weight so she has " better ability for walking and maneuvering.  She is planning a trip with her sisters for next fall and wants to be able to walk around [place].  We discussed at length (>15 minutes) an exercise routine that she can complete and a diet that she can follow in order to achieve her goals.    Her blood pressure was elevated today she is currently on metoprolol succinate 100 mg daily and losartan 100 mg daily.  She has been on metoprolol for many years and it was initially started to help with hypertension and she has never had any issues with rate and never to had a diagnosis of atrial fibrillation or anything similarly.  We are going to start weaning her off of metoprolol and initiated amlodipine 2 with her blood pressure.    Start taking 75mg of metoprolol once daily for 1 month (1 tab of 25mg and 1 tab of 50mg)  The second month take 50mg of metoprolol   The third month take 25mg of metoprol   The fourth month stop taking metoprolol   she is going to follow-up in office for  2 months to evaluate blood pressure    This will also help her ASCVD risk score.  Her cholesterol is incredibly high.  At her next visit we will discuss statin medication more thoroughly.  She is going to work on lifestyle modifications at this time.    Depression screening was completed during this visit. PHQ9 score 4 which is/not consistent with a diagnosis of depression. We discussed results and determined appropriate treatment plan if warranted.     Vaccinations: pt was recommended all vaccinations per guidelines regarding age and diagnosed conditions. Vaccinations were provided/given today as so ordered if the patient was amenable. If no vaccinations were provided, the patient declined after a risk/benefit discussion.     Cardiovascular risk discussed and if needed, lifestyle modifications were recommended, including nutritional choices, exercise, and elimination of habits contributing to risk. We agreed on a plan to reduce the current  "cardiovascular risk. Aspirin use/disuse was discussed after reviewing updated guidelines.      Discussed ACP with patient including health care power of  and living will. The patient determined his \"code status\" and it is documented in the chart. We spent >16 minutes discussing care planning during this visit.      DO KRISTINA Marshall spent a total of 47 minutes on the date of the service which included preparing to see the patient, face-to-face patient care, completing clinical documentation, performing a medically appropriate examination, counseling and educating the patient/family/caregiver and ordering medications, tests, or procedures.    "

## 2024-09-19 NOTE — PATIENT INSTRUCTIONS
Start taking 75mg of metoprolol once daily for 1 month (1 tab of 25mg and 1 tab of 50mg)  The second month take 50mg of metoprolol   The third month take 25mg of metoprol   The fourth month stop taking metoprolol     Start taking amlodpine 5mg

## 2024-10-02 ENCOUNTER — HOSPITAL ENCOUNTER (OUTPATIENT)
Dept: RADIOLOGY | Facility: CLINIC | Age: 79
Discharge: HOME | End: 2024-10-02
Payer: MEDICARE

## 2024-10-02 DIAGNOSIS — Z78.0 ASYMPTOMATIC MENOPAUSAL STATE: ICD-10-CM

## 2024-10-02 PROCEDURE — 77080 DXA BONE DENSITY AXIAL: CPT | Performed by: RADIOLOGY

## 2024-10-02 PROCEDURE — 77080 DXA BONE DENSITY AXIAL: CPT

## 2024-10-11 ENCOUNTER — HOSPITAL ENCOUNTER (OUTPATIENT)
Dept: RADIOLOGY | Facility: CLINIC | Age: 79
Discharge: HOME | End: 2024-10-11
Payer: MEDICARE

## 2024-10-11 VITALS — BODY MASS INDEX: 42.52 KG/M2 | WEIGHT: 240 LBS | HEIGHT: 63 IN

## 2024-10-11 DIAGNOSIS — Z12.31 ENCOUNTER FOR SCREENING MAMMOGRAM FOR BREAST CANCER: ICD-10-CM

## 2024-10-11 PROCEDURE — 77067 SCR MAMMO BI INCL CAD: CPT

## 2024-10-30 DIAGNOSIS — I10 HTN (HYPERTENSION), BENIGN: ICD-10-CM

## 2024-10-31 RX ORDER — LOSARTAN POTASSIUM 100 MG/1
100 TABLET ORAL DAILY
Qty: 90 TABLET | Refills: 0 | Status: SHIPPED | OUTPATIENT
Start: 2024-10-31

## 2024-11-14 DIAGNOSIS — I10 HTN (HYPERTENSION), BENIGN: ICD-10-CM

## 2024-11-14 RX ORDER — METOPROLOL SUCCINATE 50 MG/1
50 TABLET, EXTENDED RELEASE ORAL DAILY
Qty: 90 TABLET | Refills: 1 | Status: SHIPPED | OUTPATIENT
Start: 2024-11-14

## 2024-11-14 RX ORDER — METOPROLOL SUCCINATE 25 MG/1
25 TABLET, EXTENDED RELEASE ORAL DAILY
Qty: 90 TABLET | Refills: 1 | Status: SHIPPED | OUTPATIENT
Start: 2024-11-14

## 2024-12-02 ENCOUNTER — APPOINTMENT (OUTPATIENT)
Dept: PRIMARY CARE | Facility: CLINIC | Age: 79
End: 2024-12-02
Payer: MEDICARE

## 2024-12-02 VITALS
OXYGEN SATURATION: 99 % | HEART RATE: 70 BPM | DIASTOLIC BLOOD PRESSURE: 80 MMHG | SYSTOLIC BLOOD PRESSURE: 130 MMHG | TEMPERATURE: 97.5 F | WEIGHT: 242.7 LBS | BODY MASS INDEX: 43 KG/M2

## 2024-12-02 DIAGNOSIS — R73.03 PREDIABETES: Primary | ICD-10-CM

## 2024-12-02 DIAGNOSIS — I10 HTN (HYPERTENSION), BENIGN: ICD-10-CM

## 2024-12-02 LAB — POC HEMOGLOBIN A1C: 5.8 % (ref 4.2–6.5)

## 2024-12-02 PROCEDURE — 1123F ACP DISCUSS/DSCN MKR DOCD: CPT | Performed by: FAMILY MEDICINE

## 2024-12-02 PROCEDURE — G2211 COMPLEX E/M VISIT ADD ON: HCPCS | Performed by: FAMILY MEDICINE

## 2024-12-02 PROCEDURE — 1159F MED LIST DOCD IN RCRD: CPT | Performed by: FAMILY MEDICINE

## 2024-12-02 PROCEDURE — 1036F TOBACCO NON-USER: CPT | Performed by: FAMILY MEDICINE

## 2024-12-02 PROCEDURE — 99214 OFFICE O/P EST MOD 30 MIN: CPT | Performed by: FAMILY MEDICINE

## 2024-12-02 PROCEDURE — 3079F DIAST BP 80-89 MM HG: CPT | Performed by: FAMILY MEDICINE

## 2024-12-02 PROCEDURE — 83036 HEMOGLOBIN GLYCOSYLATED A1C: CPT | Performed by: FAMILY MEDICINE

## 2024-12-02 PROCEDURE — 3075F SYST BP GE 130 - 139MM HG: CPT | Performed by: FAMILY MEDICINE

## 2024-12-02 PROCEDURE — 1158F ADVNC CARE PLAN TLK DOCD: CPT | Performed by: FAMILY MEDICINE

## 2024-12-02 ASSESSMENT — PATIENT HEALTH QUESTIONNAIRE - PHQ9
1. LITTLE INTEREST OR PLEASURE IN DOING THINGS: NOT AT ALL
2. FEELING DOWN, DEPRESSED OR HOPELESS: NOT AT ALL
SUM OF ALL RESPONSES TO PHQ9 QUESTIONS 1 AND 2: 0

## 2024-12-02 ASSESSMENT — ENCOUNTER SYMPTOMS
LOSS OF SENSATION IN FEET: 0
DEPRESSION: 0
OCCASIONAL FEELINGS OF UNSTEADINESS: 0

## 2024-12-02 NOTE — PROGRESS NOTES
Subjective   Patient ID: Kathie A Olszewski is a 79 y.o. female who presents for Hypertension.    Patient doing well with a down titration of her metoprolol.  He is currently on 25 mg of metoprolol.  No racing heartbeats no palpitations no other effects from coming off of the medication.  She has been working diligently on weight loss and is down 7 pounds  And avoiding carbohydrates.    She has been dancing for exercise    Objective   /80 (BP Location: Left arm, Patient Position: Sitting, BP Cuff Size: Large adult)   Pulse 70   Temp 36.4 °C (97.5 °F) (Skin)   Wt 110 kg (242 lb 11.2 oz)   SpO2 99%   BMI 43.00 kg/m²     Physical Exam:     Constitutional:   General: not in acute distress  Appearance: normal appearance, non-toxic, not ill-appearing or diaphoretic.   HENT:   Head: Normocephalic and atraumatic  Eyes: EOMI, PERRL  CV: RRR, Normal Pulses, Normal Heart sounds  Pulm: CTAB, effort normal  Neuro: CN II-XII Intact, alert, oriented x3      Assessment/Plan      continue with current plan she will be off metoprolol soon.  Continue with amlodipine And losartan.  A1c improved to 5.8%.  Follow-up in 6 months for chronic conditions.    Problem List Items Addressed This Visit       HTN (hypertension), benign    Relevant Orders    Lipid Panel    CBC    Hemoglobin A1C    Comprehensive Metabolic Panel    Prediabetes - Primary    Relevant Orders    POCT glycosylated hemoglobin (Hb A1C) manually resulted (Completed)    Lipid Panel    CBC    Hemoglobin A1C    Comprehensive Metabolic Panel             DO KRISTINA Marshall spent a total of 30 minutes on the date of the service which included preparing to see the patient, face-to-face patient care, completing clinical documentation, performing a medically appropriate examination, counseling and educating the patient/family/caregiver and ordering medications, tests, or procedures.

## 2025-02-01 DIAGNOSIS — I10 HTN (HYPERTENSION), BENIGN: ICD-10-CM

## 2025-02-03 RX ORDER — LOSARTAN POTASSIUM 100 MG/1
100 TABLET ORAL DAILY
Qty: 90 TABLET | Refills: 0 | Status: SHIPPED | OUTPATIENT
Start: 2025-02-03

## 2025-05-28 DIAGNOSIS — F41.8 ANXIETY ABOUT HEALTH: ICD-10-CM

## 2025-05-29 LAB
ALBUMIN SERPL-MCNC: 4.1 G/DL (ref 3.6–5.1)
ALP SERPL-CCNC: 55 U/L (ref 37–153)
ALT SERPL-CCNC: 10 U/L (ref 6–29)
ANION GAP SERPL CALCULATED.4IONS-SCNC: 10 MMOL/L (CALC) (ref 7–17)
AST SERPL-CCNC: 12 U/L (ref 10–35)
BILIRUB SERPL-MCNC: 0.5 MG/DL (ref 0.2–1.2)
BUN SERPL-MCNC: 21 MG/DL (ref 7–25)
CALCIUM SERPL-MCNC: 9.1 MG/DL (ref 8.6–10.4)
CHLORIDE SERPL-SCNC: 104 MMOL/L (ref 98–110)
CHOLEST SERPL-MCNC: 226 MG/DL
CHOLEST/HDLC SERPL: 4.5 (CALC)
CO2 SERPL-SCNC: 27 MMOL/L (ref 20–32)
CREAT SERPL-MCNC: 0.71 MG/DL (ref 0.6–1)
EGFRCR SERPLBLD CKD-EPI 2021: 87 ML/MIN/1.73M2
ERYTHROCYTE [DISTWIDTH] IN BLOOD BY AUTOMATED COUNT: 13 % (ref 11–15)
EST. AVERAGE GLUCOSE BLD GHB EST-MCNC: 120 MG/DL
EST. AVERAGE GLUCOSE BLD GHB EST-SCNC: 6.6 MMOL/L
GLUCOSE SERPL-MCNC: 103 MG/DL (ref 65–99)
HBA1C MFR BLD: 5.8 %
HCT VFR BLD AUTO: 40.8 % (ref 35–45)
HDLC SERPL-MCNC: 50 MG/DL
HGB BLD-MCNC: 13.6 G/DL (ref 11.7–15.5)
LDLC SERPL CALC-MCNC: 151 MG/DL (CALC)
MCH RBC QN AUTO: 30.6 PG (ref 27–33)
MCHC RBC AUTO-ENTMCNC: 33.3 G/DL (ref 32–36)
MCV RBC AUTO: 91.7 FL (ref 80–100)
NONHDLC SERPL-MCNC: 176 MG/DL (CALC)
PLATELET # BLD AUTO: 153 THOUSAND/UL (ref 140–400)
PMV BLD REES-ECKER: 9.7 FL (ref 7.5–12.5)
POTASSIUM SERPL-SCNC: 4.1 MMOL/L (ref 3.5–5.3)
PROT SERPL-MCNC: 7.2 G/DL (ref 6.1–8.1)
RBC # BLD AUTO: 4.45 MILLION/UL (ref 3.8–5.1)
SODIUM SERPL-SCNC: 141 MMOL/L (ref 135–146)
TRIGL SERPL-MCNC: 127 MG/DL
WBC # BLD AUTO: 6.2 THOUSAND/UL (ref 3.8–10.8)

## 2025-05-29 RX ORDER — CITALOPRAM 10 MG/1
10 TABLET ORAL DAILY
Qty: 90 TABLET | Refills: 0 | Status: SHIPPED | OUTPATIENT
Start: 2025-05-29

## 2025-05-31 ASSESSMENT — ENCOUNTER SYMPTOMS
HYPERTENSION: 1
BLURRED VISION: 0
SHORTNESS OF BREATH: 0
PND: 0
NECK PAIN: 0
PALPITATIONS: 0
ORTHOPNEA: 0
HEADACHES: 0
SWEATS: 0

## 2025-06-02 ENCOUNTER — APPOINTMENT (OUTPATIENT)
Dept: PRIMARY CARE | Facility: CLINIC | Age: 80
End: 2025-06-02
Payer: MEDICARE

## 2025-06-02 VITALS
BODY MASS INDEX: 42.24 KG/M2 | WEIGHT: 238.4 LBS | OXYGEN SATURATION: 99 % | TEMPERATURE: 97.9 F | DIASTOLIC BLOOD PRESSURE: 80 MMHG | SYSTOLIC BLOOD PRESSURE: 140 MMHG | HEART RATE: 79 BPM

## 2025-06-02 DIAGNOSIS — L57.0 ACTINIC KERATOSIS: ICD-10-CM

## 2025-06-02 DIAGNOSIS — Z12.31 SCREENING MAMMOGRAM FOR BREAST CANCER: ICD-10-CM

## 2025-06-02 DIAGNOSIS — E66.01 SEVERE OBESITY (MULTI): Primary | ICD-10-CM

## 2025-06-02 DIAGNOSIS — F41.8 ANXIETY ABOUT HEALTH: ICD-10-CM

## 2025-06-02 DIAGNOSIS — I10 HTN (HYPERTENSION), BENIGN: ICD-10-CM

## 2025-06-02 PROCEDURE — 1158F ADVNC CARE PLAN TLK DOCD: CPT | Performed by: FAMILY MEDICINE

## 2025-06-02 PROCEDURE — 1123F ACP DISCUSS/DSCN MKR DOCD: CPT | Performed by: FAMILY MEDICINE

## 2025-06-02 PROCEDURE — 1159F MED LIST DOCD IN RCRD: CPT | Performed by: FAMILY MEDICINE

## 2025-06-02 PROCEDURE — 1036F TOBACCO NON-USER: CPT | Performed by: FAMILY MEDICINE

## 2025-06-02 PROCEDURE — G0447 BEHAVIOR COUNSEL OBESITY 15M: HCPCS | Performed by: FAMILY MEDICINE

## 2025-06-02 PROCEDURE — 99214 OFFICE O/P EST MOD 30 MIN: CPT | Performed by: FAMILY MEDICINE

## 2025-06-02 PROCEDURE — 3077F SYST BP >= 140 MM HG: CPT | Performed by: FAMILY MEDICINE

## 2025-06-02 PROCEDURE — 3079F DIAST BP 80-89 MM HG: CPT | Performed by: FAMILY MEDICINE

## 2025-06-02 PROCEDURE — G2211 COMPLEX E/M VISIT ADD ON: HCPCS | Performed by: FAMILY MEDICINE

## 2025-06-02 RX ORDER — LOSARTAN POTASSIUM 100 MG/1
100 TABLET ORAL DAILY
Qty: 90 TABLET | Refills: 3 | Status: SHIPPED | OUTPATIENT
Start: 2025-06-02 | End: 2026-06-02

## 2025-06-02 RX ORDER — CITALOPRAM 10 MG/1
10 TABLET ORAL DAILY
Qty: 90 TABLET | Refills: 3 | Status: SHIPPED | OUTPATIENT
Start: 2025-06-02

## 2025-06-02 RX ORDER — AMLODIPINE BESYLATE 5 MG/1
5 TABLET ORAL DAILY
Qty: 90 TABLET | Refills: 3 | Status: SHIPPED | OUTPATIENT
Start: 2025-06-02 | End: 2026-06-02

## 2025-06-02 ASSESSMENT — ENCOUNTER SYMPTOMS
LOSS OF SENSATION IN FEET: 0
DEPRESSION: 0
OCCASIONAL FEELINGS OF UNSTEADINESS: 0

## 2025-06-02 NOTE — PROGRESS NOTES
Assessment/Plan       Problem List Items Addressed This Visit       HTN (hypertension), benign    Relevant Medications    losartan (Cozaar) 100 mg tablet    amLODIPine (Norvasc) 5 mg tablet    Anxiety about health    Relevant Medications    citalopram (CeleXA) 10 mg tablet    Severe obesity (Multi) - Primary    Body mass index (BMI) 40.0-44.9, adult (Multi)     Informed consent:  Discussed the risks (permanent scarring, light or dark discoloration, infection, pain, bleeding, bruising, redness, blister formation, and recurrence of the lesion) and the benefits of the procedure, as well as the alternatives.  Informed consent was obtained.    The hyperkeratotic portion of the lesion was removed with sharp debridement.  The lesion was destroyed with liquid nitrogen.  It was frozen until the ice ball extended beyond the lesion.  3 freeze/thaw cycles were done.     Discussed lifsetyle modifications regarding weight loss x15 minutes.     Subjective   Patient ID: Kathie A Olszewski is a 80 y.o. female who presents for 6 Month Follow Up.    Answers submitted by the patient for this visit:  High Blood Pressure Questionnaire (Submitted on 5/31/2025)  Chief Complaint: Hypertension  Chronicity: chronic  Onset: more than 1 year ago  Progression since onset: rapidly improving  Condition status: controlled  anxiety: No  blurred vision: No  chest pain: No  headaches: No  malaise/fatigue: No  neck pain: No  orthopnea: No  palpitations: No  peripheral edema: No  PND: No  shortness of breath: No  sweats: No  Agents associated with hypertension: NSAIDs  CAD risks: obesity  Compliance problems: exercise    A1c improved to 5.8%   Continues w/ diet and exercise.     Objective   /80 (BP Location: Left arm, Patient Position: Sitting, BP Cuff Size: Large adult)   Pulse 79   Temp 36.6 °C (97.9 °F) (Skin)   Wt 108 kg (238 lb 6.4 oz)   SpO2 99%   BMI 42.24 kg/m²     Physical Exam:     Constitutional:   General: not in acute  distress  Appearance: normal appearance, non-toxic, not ill-appearing or diaphoretic.   HENT:   Head: Normocephalic and atraumatic  Eyes: EOMI, ABRAM Unger DO     I spent a total of 24 minutes on the date of the service which included preparing to see the patient, face-to-face patient care, completing clinical documentation, performing a medically appropriate examination, counseling and educating the patient/family/caregiver and ordering medications, tests, or procedures.

## 2025-09-08 ENCOUNTER — APPOINTMENT (OUTPATIENT)
Dept: PRIMARY CARE | Facility: CLINIC | Age: 80
End: 2025-09-08
Payer: MEDICARE

## 2025-12-02 ENCOUNTER — APPOINTMENT (OUTPATIENT)
Dept: DERMATOLOGY | Facility: CLINIC | Age: 80
End: 2025-12-02
Payer: MEDICARE